# Patient Record
Sex: FEMALE | Race: WHITE | NOT HISPANIC OR LATINO | Employment: OTHER | URBAN - METROPOLITAN AREA
[De-identification: names, ages, dates, MRNs, and addresses within clinical notes are randomized per-mention and may not be internally consistent; named-entity substitution may affect disease eponyms.]

---

## 2021-10-30 ENCOUNTER — HOSPITAL ENCOUNTER (EMERGENCY)
Facility: MEDICAL CENTER | Age: 56
End: 2021-10-30
Attending: EMERGENCY MEDICINE
Payer: MEDICAID

## 2021-10-30 VITALS
OXYGEN SATURATION: 94 % | TEMPERATURE: 98.1 F | DIASTOLIC BLOOD PRESSURE: 73 MMHG | RESPIRATION RATE: 18 BRPM | HEART RATE: 103 BPM | WEIGHT: 130 LBS | SYSTOLIC BLOOD PRESSURE: 129 MMHG

## 2021-10-30 DIAGNOSIS — S01.21XA NASAL LACERATION, INITIAL ENCOUNTER: ICD-10-CM
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
## 2021-10-30 LAB — ETHANOL BLD-MCNC: 10.2 MG/DL (ref 0–10)

## 2021-10-30 PROCEDURE — 99284 EMERGENCY DEPT VISIT MOD MDM: CPT

## 2021-10-30 PROCEDURE — 304217 HCHG IRRIGATION SYSTEM

## 2021-10-30 PROCEDURE — 700105 HCHG RX REV CODE 258: Performed by: EMERGENCY MEDICINE

## 2021-10-30 PROCEDURE — 304999 HCHG REPAIR-SIMPLE/INTERMED LEVEL 1

## 2021-10-30 PROCEDURE — 82077 ASSAY SPEC XCP UR&BREATH IA: CPT

## 2021-10-30 PROCEDURE — 303353 HCHG DERMABOND SKIN ADHESIVE

## 2021-10-30 RX ORDER — SODIUM CHLORIDE, SODIUM LACTATE, POTASSIUM CHLORIDE, CALCIUM CHLORIDE 600; 310; 30; 20 MG/100ML; MG/100ML; MG/100ML; MG/100ML
1000 INJECTION, SOLUTION INTRAVENOUS ONCE
Status: COMPLETED | OUTPATIENT
Start: 2021-10-30 | End: 2021-10-30

## 2021-10-30 RX ADMIN — SODIUM CHLORIDE, POTASSIUM CHLORIDE, SODIUM LACTATE AND CALCIUM CHLORIDE 1000 ML: 600; 310; 30; 20 INJECTION, SOLUTION INTRAVENOUS at 19:33

## 2021-10-31 NOTE — ED TRIAGE NOTES
Pt to rm B17 bib remsa .  Chief Complaint   Patient presents with   • T-5000 Facial Trauma     pt found down outside of house by her neighbors. pt does not recall events surrounding fall    • Facial Injury     abrasion to nose

## 2021-10-31 NOTE — DISCHARGE INSTRUCTIONS
Allow the edges of the Steri-Strips to peel up, and gently trim them until the Steri-Strips are gone. This usually takes about a week. You can allow the injured area to get wet, but do not scrub it. You can wash the rest of your face normally with simple soap and water.    It is possible that you have a nasal bone fracture, but your nose is still straight. These are almost always left to heal on their own.

## 2021-10-31 NOTE — ED PROVIDER NOTES
"ED Provider Note    Scribed for Massimo Aranda M.D. by Massimo Aranda M.D.. 10/30/2021,  7:22 PM.    CHIEF COMPLAINT  Chief Complaint   Patient presents with   • T-5000 Facial Trauma     pt found down outside of house by her neighbors. pt does not recall events surrounding fall    • Facial Injury     abrasion to nose        HPI  Kacie Avila is a cooperative and pleasant, slightly agitated 55 y.o. female who presents to the Emergency Department brought in by EMS after a fall.  She said that she was carrying presents down a flight of stairs, because \"I got a little money, and I bought some presents for my grandkids.\"  She suspects that she slipped caring the presence down the stairs.  She admits to some alcohol and marijuana use.  She has some mild pain across the bridge of her nose at the site of her laceration.  She has no neck or back pain to have a headache or confusion.    REVIEW OF SYSTEMS  See HPI for further details. All other systems are negative.     PAST MEDICAL HISTORY   No blood thinners.  No easy bleeding or bruising.    SOCIAL HISTORY  Social History     Tobacco Use   • Smoking status: Current Every Day Smoker   • Smokeless tobacco: Never Used   Vaping Use   • Vaping Use: Never used   Substance and Sexual Activity   • Alcohol use: Yes   • Drug use: Yes     Comment: thc   • Sexual activity: Not on file     Social History     Substance and Sexual Activity   Drug Use Yes    Comment: thc       SURGICAL HISTORY   has a past surgical history that includes other orthopedic surgery.    CURRENT MEDICATIONS  Home Medications     Reviewed by Dana Sexton R.N. (Registered Nurse) on 10/30/21 at 1915  Med List Status: <None>   Medication Last Dose Status        Patient Ranulfo Taking any Medications                       ALLERGIES  No Known Allergies    PHYSICAL EXAM  VITAL SIGNS: /80   Pulse (!) 130   Temp 36.6 °C (97.8 °F) (Temporal)   Resp 16   Wt 59 kg (130 lb)   SpO2 100%   Pulse ox " interpretation: I interpret this pulse ox as normal.  Constitutional: Alert in no apparent distress.  HENT: Superficial horizontal laceration measuring less than one half of a centimeter, across the bridge of the nose.  No active bleeding.  No gross deformity.  No septal hematoma., Bilateral external ears normal,   Eyes: Conjunctiva normal, Non-icteric.   Neck: Normal range of motion, Supple, No stridor.  No midline bony tenderness.  Lymphatic: No lymphadenopathy noted.   Cardiovascular: Regular rate and rhythm, no murmurs.   Thorax & Lungs: Normal breath sounds, No respiratory distress, No wheezing, No chest tenderness.   Abdomen: Bowel sounds normal, Soft, No tenderness, No masses, No pulsatile masses. No peritoneal signs.  Skin: Warm, Dry, No erythema, No rash.   Back: No midline bony tenderness.  Extremities: Intact distal pulses, No edema, No cyanosis.  Musculoskeletal: Good range of motion in all major joints. No or major deformities noted.   Neurologic: Alert , Normal motor function, Normal sensory function, No focal deficits noted.   Psychiatric: Affect normal, Judgment normal, Mood normal.     DIAGNOSTIC STUDIES / PROCEDURES    Laceration Repair Procedure Note    Indication: Laceration    Procedure: The patient was placed in the appropriate position and anesthesia around the laceration was not performed at the patient's request. The area was then cleansed using sterile gauze and water. The laceration was closed with steri strips. There were no additional lacerations requiring repair.     Total repaired wound length: 0.5 cm.     Other Items: None    The patient tolerated the procedure well.    Complications: None        LABS  Labs Reviewed   DIAGNOSTIC ALCOHOL - Abnormal; Notable for the following components:       Result Value    Diagnostic Alcohol 10.2 (*)     All other components within normal limits     All labs reviewed by me.    RADIOLOGY  No orders to display     The radiologist's interpretation of  all radiological studies have been reviewed by me.    COURSE & MEDICAL DECISION MAKING  Nursing notes, VS, PMSFHx reviewed in chart.     7:22 PM Patient seen and examined at bedside.  She sustained a ground-level fall, likely because of obscured vision, carrying a pile of presents in her arms down a flight of stairs.  She is awake and alert, cooperative, agitated, but no distracting injury.  There is no indication of head trauma or neck trauma.  She says that her tetanus shot was less than 5 years ago.  She is not on blood thinners.  There is no reason for head CT or C-spine CT.  Her tachycardia is improving with IV fluids.  This seems likely secondary to polysubstance use.  I will call the hospitalist ultimately have some suspicion for unreported stimulant use, based on her agitation, but she is still pleasant and cooperative, not distracted, not psychotic.  No evidence of infection.  Nasal bridge laceration will be irrigated and closed with a Steri-Strip and Dermabond.  Skin in the location of the laceration is too thin to appropriately suture.    8:22 PM Pt. reting comfortably, on the phone with family, heart rate 113 from 130, blood alcohol only slightly elevated.     8:48 PM DC after lac closure, calm, improved condition, happy to go home.      The patient will return for new or worsening symptoms and is stable at the time of discharge.    The patient is referred to a primary physician for blood pressure management, diabetic screening, and for all other preventative health concerns.      DISPOSITION:  Patient will be discharged home in stable condition.    FOLLOW UP:  [facility], Emergency Dept  [address] [address] [phone]    If symptoms worsen      OUTPATIENT MEDICATIONS:  New Prescriptions    No medications on file         FINAL IMPRESSION  1. Nasal laceration, initial encounter    2. Fall, initial encounter

## 2022-01-04 ENCOUNTER — APPOINTMENT (OUTPATIENT)
Dept: RADIOLOGY | Facility: MEDICAL CENTER | Age: 57
End: 2022-01-04
Attending: EMERGENCY MEDICINE
Payer: MEDICAID

## 2022-01-04 ENCOUNTER — HOSPITAL ENCOUNTER (EMERGENCY)
Facility: MEDICAL CENTER | Age: 57
End: 2022-01-04
Attending: EMERGENCY MEDICINE
Payer: MEDICAID

## 2022-01-04 VITALS
RESPIRATION RATE: 18 BRPM | SYSTOLIC BLOOD PRESSURE: 145 MMHG | DIASTOLIC BLOOD PRESSURE: 90 MMHG | TEMPERATURE: 97.6 F | OXYGEN SATURATION: 98 % | WEIGHT: 133.82 LBS | HEART RATE: 99 BPM

## 2022-01-04 DIAGNOSIS — J02.9 PHARYNGITIS, UNSPECIFIED ETIOLOGY: ICD-10-CM

## 2022-01-04 PROCEDURE — 71045 X-RAY EXAM CHEST 1 VIEW: CPT

## 2022-01-04 PROCEDURE — 99283 EMERGENCY DEPT VISIT LOW MDM: CPT

## 2022-01-04 PROCEDURE — U0003 INFECTIOUS AGENT DETECTION BY NUCLEIC ACID (DNA OR RNA); SEVERE ACUTE RESPIRATORY SYNDROME CORONAVIRUS 2 (SARS-COV-2) (CORONAVIRUS DISEASE [COVID-19]), AMPLIFIED PROBE TECHNIQUE, MAKING USE OF HIGH THROUGHPUT TECHNOLOGIES AS DESCRIBED BY CMS-2020-01-R: HCPCS

## 2022-01-04 PROCEDURE — U0005 INFEC AGEN DETEC AMPLI PROBE: HCPCS

## 2022-01-04 RX ORDER — BENZONATATE 100 MG/1
100 CAPSULE ORAL 3 TIMES DAILY PRN
Qty: 60 CAPSULE | Refills: 0 | Status: SHIPPED | OUTPATIENT
Start: 2022-01-04

## 2022-01-04 RX ORDER — AMOXICILLIN AND CLAVULANATE POTASSIUM 875; 125 MG/1; MG/1
1 TABLET, FILM COATED ORAL 2 TIMES DAILY
Qty: 20 TABLET | Refills: 0 | Status: SHIPPED | OUTPATIENT
Start: 2022-01-04

## 2022-01-04 NOTE — ED TRIAGE NOTES
Kacie Avila  56 y.o.  female  Chief Complaint   Patient presents with   • Sore Throat     x 1 month, hard to swallow, throat slightly red but tonsils + 1. C/o occasional fever, x 2 negative tests 1 month ago.        Patient educated on triage process, to alert staff if any changes in condition.

## 2022-01-05 LAB
SARS-COV-2 RNA RESP QL NAA+PROBE: NOTDETECTED
SPECIMEN SOURCE: NORMAL

## 2022-01-05 NOTE — ED NOTES
Patient called for room twice, no response.  Checked lobby, senior lounge, hallways, and consultation room.

## 2022-01-05 NOTE — ED PROVIDER NOTES
ED Provider Note    CHIEF COMPLAINT  Chief Complaint   Patient presents with   • Sore Throat     x 1 month, hard to swallow, throat slightly red but tonsils + 1. C/o occasional fever, x 2 negative tests 1 month ago.        HPI  Kacie Avila is a 56 y.o. female here for evaluation of sore throat. She states she has some issues with painful swallowing, but no issues with secretions.  She has no vomiting, no fever, no chills. No sob, no chest pain.     ROS;  Please see HPI  O/W negative     PAST MEDICAL HISTORY   no bleeding disorders     SOCIAL HISTORY  Social History     Tobacco Use   • Smoking status: Current Every Day Smoker   • Smokeless tobacco: Never Used   Vaping Use   • Vaping Use: Never used   Substance and Sexual Activity   • Alcohol use: Yes   • Drug use: Yes     Comment: thc   • Sexual activity: Not on file       SURGICAL HISTORY   has a past surgical history that includes other orthopedic surgery.    CURRENT MEDICATIONS  Home Medications    **Home medications have not yet been reviewed for this encounter**         ALLERGIES  No Known Allergies    REVIEW OF SYSTEMS  See HPI for further details. Review of systems as above, otherwise all other systems are negative.     PHYSICAL EXAM  VITAL SIGNS: /94   Pulse (!) 109   Temp 36.4 °C (97.5 °F) (Temporal)   Resp 18   Wt 60.7 kg (133 lb 13.1 oz)   SpO2 99%     Constitutional: Well developed, well nourished. No acute distress.  HEENT: Normocephalic, atraumatic. MMM, plus one tonsils.   Neck: Supple, Full range of motion , no stridor  Chest/Pulmonary:  No respiratory distress.  Equal expansion   Musculoskeletal: No deformity, no edema, neurovascular intact.   Neuro: Clear speech, appropriate, cooperative, cranial nerves II-XII grossly intact.  Psych: Normal mood and affect      PROCEDURES     MEDICAL RECORD  I have reviewed patient's medical record and pertinent results are listed.    COURSE & MEDICAL DECISION MAKING  I have reviewed any medical  record information, laboratory studies and radiographic results as noted above.    DX-CHEST-LIMITED (1 VIEW)   Final Result      No acute cardiopulmonary abnormality.        11:06 PM  The pt has no fever/chills.  She has no vomiting, no cp, no sob.  She is here for her sore throat and dry cough.  She will be discharged home, and will follow up with her covid test    I you have had any blood pressure issues while here in the emergency department, please see your doctor for a further evaluation or work up.    Differential diagnoses include but not limited to: viral illness, pn, covid     This patient presents with viral illness .  At this time, I have counseled the patient/family regarding their medications, pain control, and follow up.  They will continue their medications, if any, as prescribed.  They will return immediately for any worsening symptoms and/or any other medical concerns.  They will see their doctor, or contact the doctor provided, in 1-2 days for follow up.       FINAL IMPRESSION  Pharyngitis     Electronically signed by: Ajit Najera D.O., 1/4/2022 11:06 PM

## 2022-01-05 NOTE — ED NOTES
Discharge education provided. Discharge paperwork signed by pt. Prescription to be picked up by pt. All questions answered. All belongings with pt. Pt ambulated to lobby unassisted with steady gait.